# Patient Record
Sex: FEMALE | Race: WHITE | Employment: FULL TIME | URBAN - METROPOLITAN AREA
[De-identification: names, ages, dates, MRNs, and addresses within clinical notes are randomized per-mention and may not be internally consistent; named-entity substitution may affect disease eponyms.]

---

## 2022-06-16 ENCOUNTER — HOSPITAL ENCOUNTER (EMERGENCY)
Age: 27
Discharge: ANOTHER HOSPITAL | End: 2022-06-16
Attending: EMERGENCY MEDICINE
Payer: COMMERCIAL

## 2022-06-16 VITALS
DIASTOLIC BLOOD PRESSURE: 86 MMHG | OXYGEN SATURATION: 100 % | SYSTOLIC BLOOD PRESSURE: 136 MMHG | HEART RATE: 99 BPM | RESPIRATION RATE: 23 BRPM | TEMPERATURE: 98.1 F

## 2022-06-16 PROCEDURE — 96365 THER/PROPH/DIAG IV INF INIT: CPT

## 2022-06-16 PROCEDURE — 6370000000 HC RX 637 (ALT 250 FOR IP): Performed by: EMERGENCY MEDICINE

## 2022-06-16 PROCEDURE — 2580000003 HC RX 258: Performed by: EMERGENCY MEDICINE

## 2022-06-16 PROCEDURE — 6360000002 HC RX W HCPCS

## 2022-06-16 PROCEDURE — 99285 EMERGENCY DEPT VISIT HI MDM: CPT

## 2022-06-16 RX ORDER — ACETAMINOPHEN 500 MG
1000 TABLET ORAL ONCE
Status: COMPLETED | OUTPATIENT
Start: 2022-06-16 | End: 2022-06-16

## 2022-06-16 RX ORDER — 0.9 % SODIUM CHLORIDE 0.9 %
1000 INTRAVENOUS SOLUTION INTRAVENOUS ONCE
Status: COMPLETED | OUTPATIENT
Start: 2022-06-16 | End: 2022-06-16

## 2022-06-16 RX ADMIN — SODIUM CHLORIDE 1000 ML: 0.9 INJECTION, SOLUTION INTRAVENOUS at 04:03

## 2022-06-16 RX ADMIN — Medication 1 MILLI-UNITS/MIN: at 04:57

## 2022-06-16 RX ADMIN — ACETAMINOPHEN 1000 MG: 500 TABLET ORAL at 04:05

## 2022-06-16 ASSESSMENT — PAIN - FUNCTIONAL ASSESSMENT: PAIN_FUNCTIONAL_ASSESSMENT: ACTIVITIES ARE NOT PREVENTED

## 2022-06-16 ASSESSMENT — PAIN DESCRIPTION - LOCATION: LOCATION: ABDOMEN

## 2022-06-16 ASSESSMENT — PAIN DESCRIPTION - DESCRIPTORS: DESCRIPTORS: CRAMPING

## 2022-06-16 ASSESSMENT — PAIN DESCRIPTION - ORIENTATION: ORIENTATION: MID

## 2022-06-16 NOTE — ED PROVIDER NOTES
[address] ENCOUNTER          EM ATTENDING NOTE       Date of evaluation: 2022    Chief Complaint     Laboring      History of Present Illness     Madi Wall is a 24 y.o. female G3, P3 previously at 40 weeks 6 days who presents after spontaneous vaginal delivery while in route to hospital.    Patient started to feel pressure and contractions earlier this evening. She notes her water broke approximately 3:10 AM and called EMS who reportedly did not notice any  during initial pickup. She suddenly felt more pressure and recheck him as noted . Due to anticipation of precipitous delivery they rerouted to the [facility] for assistance. Approximately 10 minutes prior to arrival the patient had spontaneous vaginal delivery without complication or nuchal cords of a well-appearing  female.  was noted to have vigorous cry and good color and placed directly for chest chest.  Upon arrival the patient states that she has 6-10 severity pain in her vagina but no abdominal pain. She reports a feeling of pressure and need to push the placenta. She denies any recent fevers, cough, shortness of breath or chest pain. Patient states that she had normal prenatal care and was GBS negative. Review of Systems     Positive: Spontaneous vaginal delivery, vaginal pain    Negative: Fever, cough, shortness of breath, chest pain, abdominal pain, nausea, vomiting, changes in bladder or bowel function    See HPI. A complete review of systems wasconducted and is otherwise negative unless noted above. Past Medical, Surgical, Family, and Social History     She has no past medical history on file. She has no past surgical history on file. Her family history is not on file. She     Medications     Previous Medications    No medications on file       Allergies     She has no allergies on file.     Physical Exam     INITIAL VITALS: BP: 133/83, Temp: 98.1 °F (36.7 °C), Heart Rate: 99, Resp: 18, SpO2: 98 %   Physical Exam    General:  Well developed adult female in no acute distress, able toconverse in full sentences  HEENT:  Normocephalic, atraumatic, PERRL, extra-ocular movements intact, oropharynx benign  Neck:  Supple, no lymphadenopathy, trachea midline, no masses  Pulmonary:   Clear to auscultation bilaterally, good air movement, no wheezes  Cardiac:  Regular rate and rhythm, no M/R/G  Abdomen:  Soft, firm uterus below the level of umbilicus, no rebound or guarding,  exam notable for no active bleeding with umbilical cord exiting the vagina. Musculoskeletal:  2+ pulses, no edema or clubbing  Skin:  No concerning rashes or lesions, no cyanosis  Neuro: Alert and oriented X 4,able to move all four extremities with equal strength bilaterally, sensory exam grossly intact, cranial nerves II-XII intact  Psych:  Appropriate mood and affect    Diagnostic Results       RADIOLOGY:  No orders to display       LABS:   No results found for this visit on 22. ED BEDSIDE ULTRASOUND:      RECENT VITALS:  BP: 126/84, Temp: 98.1 °F (36.7 °C), Heart Rate: 100, Resp: 21, SpO2: 99 %     Procedures     Procedures    Umbilical cord clamping. Verbal consent obtained from patient, emergent situation. Wearing sterile gloves, a umbilical cord clamp was placed approximately 2 inches from umbilical stump of . Hemostats were then clamped approximately 2 cm away from the umbilical clamp towards mother after milking was performed to remove excess blood from the area. Father of baby then performed umbilical cord cutting in usual fashion. Three-vessel cord noted    Placenta delivery  Verbal consent obtained from patient, emergent situation  Using hemostats and steady pressure the placenta was delivered in usual fashion. Counterpressure was maintained on uterus which was massaged and firm throughout the procedure. Placenta was delivered and appears to be intact.   Inspection of vaginal vault and external genitalia shows no large lacerations or active bleeding. ED Course     Nursing Notes, Past Medical Hx, Past Surgical Hx, Social Hx, Allergies, and Family Hx were reviewed. The patient was given the following medications:  Orders Placed This Encounter   Medications    0.9 % sodium chloride bolus    acetaminophen (TYLENOL) tablet 1,000 mg    DISCONTD: oxytocin (PITOCIN) 30 units in 500 mL infusion    DISCONTD: oxytocin (PITOCIN) 30 units in 500 mL infusion    oxytocin (PITOCIN) 30 units in 500 mL infusion       CONSULTS:  None    MEDICAL DECISION MAKING / ASSESSMENT / Rocio Yessenia is a 24 y.o. female with a history andpresentation as described above in HPI. The patient was evaluated by myself, the ED Attending Physician. Patient is a 60-year-old G3 now P3 status post spontaneous vaginal delivery of a term, 40-week and 6-day  female. Delivery was performed and ambulance in route to our facility. At time arrival both mother and  are well-appearing. Please see separate documentation for examination and management of . Briefly  was noted to be pink, crying with Apgar of 10. Mother was transferred to our stretcher and umbilical cord was clamped and cut in usual fashion. Uterus was palpated by myself and found to be firm. Proceeded with placental delivery in usual fashion. On inspection of the placenta it appears to be intact. Three-vessel cord noted. No obvious large lacerations or active hemorrhage noted. Pitocin ordered and administered. On frequent rechecking uterus remained firm and patient without any active vaginal bleeding. I discussed the patient with the [facility] transfer center and Dr. Young Agudelo of OB/GYN who accepted the patient for transfer. Pitocin will continue to run while being transferred. Placenta will be transported with patient for further inspection by OB/GYN.       Patient was treated with below medications:  Medications   oxytocin (PITOCIN) 30 units in 500 mL infusion (1 jane-units/min IntraVENous New Bag 6/16/22 7219)   0.9 % sodium chloride bolus (1,000 mLs IntraVENous New Bag 6/16/22 6979)   acetaminophen (TYLENOL) tablet 1,000 mg (1,000 mg Oral Given 6/16/22 8430)         Clinical Impression     1. Spontaneous vaginal delivery        Disposition     PATIENT REFERREDTO:  No follow-up provider specified.     DISCHARGE MEDICATIONS:  New Prescriptions    No medications on file       DISPOSITION Decision To Transfer 06/16/2022 04:41:32 AM          Christina Beltran MD  06/16/22 4889

## 2022-06-16 NOTE — ED NOTES
PITOCIN rate changed to bolus in one hour per verbal order from MD KEVIN Latif Pages.       Edythe Royals, [place]  06/16/22 1872